# Patient Record
Sex: FEMALE | Race: WHITE | NOT HISPANIC OR LATINO | URBAN - METROPOLITAN AREA
[De-identification: names, ages, dates, MRNs, and addresses within clinical notes are randomized per-mention and may not be internally consistent; named-entity substitution may affect disease eponyms.]

---

## 2017-01-27 ENCOUNTER — OFFICE (OUTPATIENT)
Dept: URBAN - METROPOLITAN AREA CLINIC 10 | Facility: CLINIC | Age: 82
End: 2017-01-27
Payer: COMMERCIAL

## 2017-01-27 VITALS
WEIGHT: 162 LBS | HEART RATE: 76 BPM | HEIGHT: 66 IN | DIASTOLIC BLOOD PRESSURE: 72 MMHG | SYSTOLIC BLOOD PRESSURE: 141 MMHG

## 2017-01-27 DIAGNOSIS — K74.60 UNSPECIFIED CIRRHOSIS OF LIVER: ICD-10-CM

## 2017-01-27 DIAGNOSIS — R19.4 CHANGE IN BOWEL HABIT: ICD-10-CM

## 2017-01-27 PROCEDURE — 99214 OFFICE O/P EST MOD 30 MIN: CPT

## 2017-01-27 PROCEDURE — 99070 SPECIAL SUPPLIES PHYS/QHP: CPT

## 2017-01-27 PROCEDURE — G8427 DOCREV CUR MEDS BY ELIG CLIN: HCPCS

## 2017-01-27 NOTE — SERVICEHPINOTES
The patient is an 84-year-old white female who is referred for evaluation regarding change in bowel movements. Apparently, she was noted to have had a couple of black stools, but these subsequently cleared up and she has had no further problem. She denies any bright red blood being seen in the stools. She was having some problems with constipation but not now. She denies any abdominal pain. There is no history of peptic ulcer disease. She is on Prilosec. Recent hematocrit was 35.8. Liver enzymes were noted to be normal. A CT scan was done and reveals what appears to be cirrhosis with an enlarged spleen of undetermined etiology. She is also noted to have retroperitoneal adenopathy, which is unchanged from previous evaluation. She is 84 years old and is feeling fairly well at this time. She does not wish to undergo any invasive testing and agree with this.

## 2017-02-27 ENCOUNTER — OFFICE (OUTPATIENT)
Dept: URBAN - METROPOLITAN AREA CLINIC 10 | Facility: CLINIC | Age: 82
End: 2017-02-27
Payer: MEDICARE

## 2017-02-27 DIAGNOSIS — R19.4 CHANGE IN BOWEL HABIT: ICD-10-CM

## 2017-02-27 PROCEDURE — 82272 OCCULT BLD FECES 1-3 TESTS: CPT
